# Patient Record
Sex: FEMALE | Race: WHITE | NOT HISPANIC OR LATINO | URBAN - METROPOLITAN AREA
[De-identification: names, ages, dates, MRNs, and addresses within clinical notes are randomized per-mention and may not be internally consistent; named-entity substitution may affect disease eponyms.]

---

## 2021-08-02 ENCOUNTER — INPATIENT HOSPITAL (OUTPATIENT)
Dept: URBAN - METROPOLITAN AREA HOSPITAL 124 | Facility: HOSPITAL | Age: 31
End: 2021-08-02

## 2021-08-02 DIAGNOSIS — R05 COUGH: ICD-10-CM

## 2021-08-02 DIAGNOSIS — R10.13 EPIGASTRIC PAIN: ICD-10-CM

## 2021-08-02 DIAGNOSIS — K21.9 GASTRO-ESOPHAGEAL REFLUX DISEASE WITHOUT ESOPHAGITIS: ICD-10-CM

## 2021-08-02 DIAGNOSIS — R06.02 SHORTNESS OF BREATH: ICD-10-CM

## 2021-08-02 PROCEDURE — 99254 IP/OBS CNSLTJ NEW/EST MOD 60: CPT | Performed by: NURSE PRACTITIONER

## 2021-08-03 ENCOUNTER — INPATIENT HOSPITAL (OUTPATIENT)
Dept: URBAN - METROPOLITAN AREA HOSPITAL 124 | Facility: HOSPITAL | Age: 31
End: 2021-08-03

## 2021-08-03 DIAGNOSIS — K21.9 GASTRO-ESOPHAGEAL REFLUX DISEASE WITHOUT ESOPHAGITIS: ICD-10-CM

## 2021-08-03 DIAGNOSIS — R10.13 EPIGASTRIC PAIN: ICD-10-CM

## 2021-08-03 DIAGNOSIS — K31.89 OTHER DISEASES OF STOMACH AND DUODENUM: ICD-10-CM

## 2021-08-03 PROCEDURE — 43239 EGD BIOPSY SINGLE/MULTIPLE: CPT | Performed by: INTERNAL MEDICINE

## 2021-08-13 ENCOUNTER — OFFICE (OUTPATIENT)
Dept: URBAN - METROPOLITAN AREA CLINIC 11 | Facility: CLINIC | Age: 31
End: 2021-08-13

## 2021-08-13 VITALS
WEIGHT: 206 LBS | OXYGEN SATURATION: 98 % | DIASTOLIC BLOOD PRESSURE: 81 MMHG | HEIGHT: 66 IN | HEART RATE: 85 BPM | SYSTOLIC BLOOD PRESSURE: 167 MMHG

## 2021-08-13 DIAGNOSIS — E55.9 VITAMIN D DEFICIENCY, UNSPECIFIED: ICD-10-CM

## 2021-08-13 DIAGNOSIS — K58.9 IRRITABLE BOWEL SYNDROME WITHOUT DIARRHEA: ICD-10-CM

## 2021-08-13 DIAGNOSIS — J45.909 UNSPECIFIED ASTHMA, UNCOMPLICATED: ICD-10-CM

## 2021-08-13 DIAGNOSIS — K21.9 GASTRO-ESOPHAGEAL REFLUX DISEASE WITHOUT ESOPHAGITIS: ICD-10-CM

## 2021-08-13 PROCEDURE — 99214 OFFICE O/P EST MOD 30 MIN: CPT | Performed by: INTERNAL MEDICINE

## 2021-08-13 RX ORDER — SAW/PY/NET/PUMPK/BETA/LY/ZN/CU 50-20-25MG
125 CAPSULE ORAL
Qty: 90 | Refills: 1 | Status: ACTIVE
Start: 2021-08-13

## 2021-08-13 RX ORDER — PANTOPRAZOLE SODIUM 40 MG/1
80 TABLET, DELAYED RELEASE ORAL
Qty: 180 | Refills: 1 | Status: ACTIVE

## 2021-08-13 NOTE — SERVICEHPINOTES
She presents today stating that she needed to f/u from the Hospital but that she forgot to tell them some things.  She stated that she has been having issues with alternating diarrhea and constipation.  She has had some rectal spotting at times. The blood has been red and on the toilet paper. She has rarely noted blood in the toilet water.  She has also had issues with dysuria over the past year.  She stated that it takes about 45 minutes to empty her bladder.  She has had some rare burning with urination. She has been admitted four times since March for cough/SOB issues.  She had an EGD in July that was normal. She was told that her cough was possibly asthma and possibly reflux related. She is on Protonix once daily.  Prior to starting the Protonix she was having regurgitation. She has not had esophageal dysphagia. She is being treated by Marv Soto for her asthma. She has been having pain with deep breaths which also cause coughing. This causes a feeling of her throat closing for a few seconds.She was treated for strep throat on August 4th. She has been seen by rheumatology for possible Lupus. Her records from Dr. Chavarria were noted.  BR

## 2021-08-13 NOTE — SERVICENOTES
We her hx of GERD, recent EGD results/bx results, GERD diet plan and increase in the Protonix to BID.  At this point her cough did improve on steroids and the cough in the office did not sound quite like a GERD cough, but we can see if it improves on the BID Protonix.  I suspect that the cough is more of an asthmatic issue but something such as pulmonary sarcoidosis, lupus, etc...  She is to f/u with her pulmonary provider and continue her inhalers.  As to the rectal spotting, she will need an FSC at some point but with her cough, sedation and the procedure itself may be difficult.  She has had some IBS type issues and we can start her on Benefiber prior to trying ot her meds or doing the FSC with bx.  Her recent vitamin D level was 17.6 and she will need supplementation.

## 2024-03-05 ENCOUNTER — OFFICE (OUTPATIENT)
Dept: URBAN - NONMETROPOLITAN AREA CLINIC 5 | Facility: CLINIC | Age: 34
End: 2024-03-05
Payer: COMMERCIAL

## 2024-03-05 VITALS
HEIGHT: 66 IN | HEART RATE: 84 BPM | RESPIRATION RATE: 20 BRPM | SYSTOLIC BLOOD PRESSURE: 117 MMHG | DIASTOLIC BLOOD PRESSURE: 79 MMHG | WEIGHT: 217 LBS

## 2024-03-05 DIAGNOSIS — K21.9 GASTRO-ESOPHAGEAL REFLUX DISEASE WITHOUT ESOPHAGITIS: ICD-10-CM

## 2024-03-05 DIAGNOSIS — K58.9 IRRITABLE BOWEL SYNDROME WITHOUT DIARRHEA: ICD-10-CM

## 2024-03-05 DIAGNOSIS — K31.84 GASTROPARESIS: ICD-10-CM

## 2024-03-05 PROCEDURE — 99203 OFFICE O/P NEW LOW 30 MIN: CPT | Performed by: INTERNAL MEDICINE

## 2024-03-05 RX ORDER — PANTOPRAZOLE 40 MG/1
80 TABLET, DELAYED RELEASE ORAL
Qty: 30 | Refills: 11 | Status: ACTIVE

## 2024-03-05 RX ORDER — METOCLOPRAMIDE 5 MG/1
15 TABLET ORAL
Qty: 90 | Refills: 11 | Status: ACTIVE

## 2024-03-05 RX ORDER — LUBIPROSTONE 8 UG/1
16 CAPSULE, GELATIN COATED ORAL
Qty: 90 | Refills: 3 | Status: ACTIVE

## 2025-01-28 ENCOUNTER — OFFICE (OUTPATIENT)
Dept: URBAN - NONMETROPOLITAN AREA CLINIC 5 | Facility: CLINIC | Age: 35
End: 2025-01-28